# Patient Record
Sex: MALE | Race: WHITE | Employment: FULL TIME | URBAN - METROPOLITAN AREA
[De-identification: names, ages, dates, MRNs, and addresses within clinical notes are randomized per-mention and may not be internally consistent; named-entity substitution may affect disease eponyms.]

---

## 2020-09-24 ENCOUNTER — HOSPITAL ENCOUNTER (OUTPATIENT)
Age: 20
Setting detail: SPECIMEN
Discharge: HOME | End: 2020-09-24
Payer: COMMERCIAL

## 2020-09-24 ENCOUNTER — OFFICE VISIT (OUTPATIENT)
Dept: FAMILY MEDICINE CLINIC | Age: 20
End: 2020-09-24
Payer: COMMERCIAL

## 2020-09-24 VITALS
TEMPERATURE: 97.3 F | DIASTOLIC BLOOD PRESSURE: 74 MMHG | OXYGEN SATURATION: 98 % | SYSTOLIC BLOOD PRESSURE: 110 MMHG | HEIGHT: 72 IN | WEIGHT: 149.2 LBS | BODY MASS INDEX: 20.21 KG/M2 | HEART RATE: 62 BPM

## 2020-09-24 LAB
FERRITIN: 146.7 NG/ML (ref 30–400)
HCT VFR BLD CALC: 40.6 % (ref 42–52)
HEMOGLOBIN: 13.6 G/DL (ref 14–18)
MCH RBC QN AUTO: 31.4 PG (ref 27–31.3)
MCHC RBC AUTO-ENTMCNC: 33.5 % (ref 33–37)
MCV RBC AUTO: 93.9 FL (ref 80–100)
PDW BLD-RTO: 13 % (ref 11.5–14.5)
PLATELET # BLD: 208 K/UL (ref 130–400)
RBC # BLD: 4.33 M/UL (ref 4.7–6.1)
WBC # BLD: 5 K/UL (ref 4.5–11)

## 2020-09-24 PROCEDURE — 90471 IMMUNIZATION ADMIN: CPT | Performed by: FAMILY MEDICINE

## 2020-09-24 PROCEDURE — 82728 ASSAY OF FERRITIN: CPT

## 2020-09-24 PROCEDURE — 85027 COMPLETE CBC AUTOMATED: CPT

## 2020-09-24 PROCEDURE — 99385 PREV VISIT NEW AGE 18-39: CPT | Performed by: FAMILY MEDICINE

## 2020-09-24 PROCEDURE — 90686 IIV4 VACC NO PRSV 0.5 ML IM: CPT | Performed by: FAMILY MEDICINE

## 2020-09-24 RX ORDER — FLUOXETINE HYDROCHLORIDE 40 MG/1
40 CAPSULE ORAL DAILY
COMMUNITY
End: 2022-04-14

## 2020-09-24 SDOH — HEALTH STABILITY: MENTAL HEALTH: HOW OFTEN DO YOU HAVE A DRINK CONTAINING ALCOHOL?: NEVER

## 2020-09-24 ASSESSMENT — ENCOUNTER SYMPTOMS
SHORTNESS OF BREATH: 0
RHINORRHEA: 0
DIARRHEA: 0
WHEEZING: 0
CONSTIPATION: 0
COUGH: 0
ABDOMINAL PAIN: 0
SORE THROAT: 0

## 2020-09-24 ASSESSMENT — PATIENT HEALTH QUESTIONNAIRE - PHQ9
2. FEELING DOWN, DEPRESSED OR HOPELESS: 0
SUM OF ALL RESPONSES TO PHQ QUESTIONS 1-9: 0
SUM OF ALL RESPONSES TO PHQ QUESTIONS 1-9: 0
1. LITTLE INTEREST OR PLEASURE IN DOING THINGS: 0
SUM OF ALL RESPONSES TO PHQ9 QUESTIONS 1 & 2: 0

## 2020-09-24 NOTE — PROGRESS NOTES
[address] [facility] [address] [place] [address] PRIMARY CARE  Kiesha [address] [place] [address]  Dept: [phone]  Dept Fax: : [phone]   Chief Complaint  Chief Complaint   Patient presents with    Establish Care       HPI:  23 y.o.male who presents for establish:  (was seeing Dr. Rj Villalta in pediatrics)    Goes to [facility] for anxiety and depression and doing well on the prozac they provide (since age 8). Currently working as a  and going to school for business. Vapes daily along THC. Hx of anemia and told has low iron and gets lightheaded with standing sometimes. History reviewed. No pertinent past medical history. History reviewed. No pertinent surgical history.   Social History     Socioeconomic History    Marital status: Single     Spouse name: Not on file    Number of children: Not on file    Years of education: Not on file    Highest education level: Not on file   Occupational History    Not on file   Social Needs    Financial resource strain: Not on file    Food insecurity     Worry: Not on file     Inability: Not on file    Transportation needs     Medical: Not on file     Non-medical: Not on file   Tobacco Use    Smoking status: Never Smoker    Smokeless tobacco: Never Used   Substance and Sexual Activity    Alcohol use: Never     Frequency: Never    Drug use: Yes     Types: Marijuana     Comment: daily    Sexual activity: Not on file   Lifestyle    Physical activity     Days per week: Not on file     Minutes per session: Not on file    Stress: Not on file   Relationships    Social connections     Talks on phone: Not on file     Gets together: Not on file     Attends Jehovah's witness service: Not on file     Active member of club or organization: Not on file     Attends meetings of clubs or organizations: Not on file     Relationship status: Not on file    Intimate partner violence     Fear of current or ex partner: Not on

## 2021-10-01 ENCOUNTER — NURSE TRIAGE (OUTPATIENT)
Dept: OTHER | Facility: CLINIC | Age: 21
End: 2021-10-01

## 2021-10-01 NOTE — TELEPHONE ENCOUNTER
Received call from [address] [facility]. Brief description of triage: Pain in right testicle, abdominal pain as well when he pushes on something. Triage indicates for patient to see in office today    Care advice provided, patient verbalizes understanding; denies any other questions or concerns; instructed to call back for any new or worsening symptoms. Writer provided warm transfer to [facility] at [facility] for appointment scheduling. Attention Provider: Thank you for allowing me to participate in the care of your patient. The patient was connected to triage in response to information provided to the [facility]. Please do not respond through this encounter as the response is not directed to a shared pool. Reason for Disposition   Pain in scrotum or testicle is main symptom   Patient wants to be seen    Answer Assessment - Initial Assessment Questions  1. SYMPTOM: \"What's the main symptom you're concerned about? \" (e.g., discharge from penis, rash, pain, itching, swelling)      Right testicular pain and some pain in abdomen  2. LOCATION: \"Where is the abdomen located? \"      Lower right side  3. ONSET: \"When did pain start? \"      10:30 this morning  4. PAIN: \"Is there any pain? \" If so, ask: \"How bad is it? \"  (Scale 1-10; or mild, moderate, severe)      If he pushes on it 5-6/10  5. URINE: Carlo Dennis difficulty passing urine? \" If so, ask: \"When was the last time? \"      none  6. CAUSE: \"What do you think is causing the symptoms? \"      May be a hernia  7. OTHER SYMPTOMS: \"Do you have any other symptoms? \" (e.g., fever, abdominal pain, blood in urine)      Abdominal pain as well    Protocols used: PENIS AND SCROTUM SYMPTOMS-ADULT-[place], SCROTAL PAIN-ADULT-[place]

## 2022-04-06 ENCOUNTER — TELEPHONE (OUTPATIENT)
Dept: INTERNAL MEDICINE | Age: 22
End: 2022-04-06

## 2022-04-06 NOTE — TELEPHONE ENCOUNTER
LM for pt's mother to call the office to schedule an appointment if he has not been seen yet for this issue.

## 2022-04-06 NOTE — TELEPHONE ENCOUNTER
----- Message from Bob Mahmood sent at 4/5/2022  5:10 PM EDT -----  Subject: Appointment Request    Reason for Call: Urgent Abdominal Pain    QUESTIONS  Type of Appointment? Established Patient  Reason for appointment request? Other - urgent appointment  Additional Information for Provider? pt mother Bobby Vega called. She said the   pt is having stomach pain, and bloody stool. I triage to A/ and mother   refused the transfer. She said the stool was no longer bloody and wanted   to be scheduled. The call flow was a urgent appointment and I asked to   transfer to A/ again because no availability to schedule. The mother said   she would call tomorrow and hung up.  ---------------------------------------------------------------------------  --------------  CALL BACK INFO  What is the best way for the office to contact you? OK to leave message on   voicemail  Preferred Call Back Phone Number? [phone]  ---------------------------------------------------------------------------  --------------  SCRIPT ANSWERS  Relationship to Patient? Parent  Representative Name? Bobby Vega  Additional information verified (besides Name and Date of Birth)? Phone   Number  Do you have pain that has started or worsened within the past 24 hours? No  Are you vomiting blood or have bloody or black stool? No  Have you recently (14 days) seen a provider for this pain? No  Patient refusal? Patient refuses to be transferred to RN Triage  Have you been diagnosed with, awaiting test results for, or told that you   are suspected of having COVID-19 (Coronavirus)? (If patient has tested   negative or was tested as a requirement for work, school, or travel and   not based on symptoms, answer no)? No  Within the past 10 days have you developed any of the following symptoms   (answer no if symptoms have been present longer than 10 days or began   more than 10 days ago)?  Fever or Chills, Cough, Shortness of breath or   difficulty breathing, Loss of taste or smell, Sore throat, Nasal   congestion, Sneezing or runny nose, Fatigue or generalized body aches   (answer no if pain is specific to a body part e.g. back pain), Diarrhea,   Headache? No  Have you had close contact with someone with COVID-19 in the last 7 days? No  (Service Expert  click yes below to proceed with [employer] As Usual   Scheduling)?  Yes

## 2022-04-14 ENCOUNTER — OFFICE VISIT (OUTPATIENT)
Dept: FAMILY MEDICINE CLINIC | Age: 22
End: 2022-04-14
Payer: COMMERCIAL

## 2022-04-14 VITALS
BODY MASS INDEX: 19.64 KG/M2 | OXYGEN SATURATION: 97 % | SYSTOLIC BLOOD PRESSURE: 124 MMHG | DIASTOLIC BLOOD PRESSURE: 80 MMHG | HEIGHT: 72 IN | HEART RATE: 93 BPM | WEIGHT: 145 LBS | TEMPERATURE: 98.6 F

## 2022-04-14 DIAGNOSIS — K60.2 ANAL FISSURE: ICD-10-CM

## 2022-04-14 DIAGNOSIS — K59.09 OTHER CONSTIPATION: Primary | ICD-10-CM

## 2022-04-14 DIAGNOSIS — L20.82 FLEXURAL ECZEMA: ICD-10-CM

## 2022-04-14 PROCEDURE — 99213 OFFICE O/P EST LOW 20 MIN: CPT | Performed by: FAMILY MEDICINE

## 2022-04-14 RX ORDER — PSYLLIUM HUSK/CALCIUM CARB 1 G-60 MG
CAPSULE ORAL
Qty: 120 CAPSULE | Refills: 11 | Status: SHIPPED | OUTPATIENT
Start: 2022-04-14

## 2022-04-14 RX ORDER — DOCUSATE SODIUM 100 MG/1
100 CAPSULE, LIQUID FILLED ORAL 2 TIMES DAILY
Qty: 60 CAPSULE | Refills: 5 | Status: SHIPPED | OUTPATIENT
Start: 2022-04-14 | End: 2022-05-14

## 2022-04-14 SDOH — ECONOMIC STABILITY: FOOD INSECURITY: WITHIN THE PAST 12 MONTHS, YOU WORRIED THAT YOUR FOOD WOULD RUN OUT BEFORE YOU GOT MONEY TO BUY MORE.: NEVER TRUE

## 2022-04-14 SDOH — ECONOMIC STABILITY: FOOD INSECURITY: WITHIN THE PAST 12 MONTHS, THE FOOD YOU BOUGHT JUST DIDN'T LAST AND YOU DIDN'T HAVE MONEY TO GET MORE.: NEVER TRUE

## 2022-04-14 ASSESSMENT — ENCOUNTER SYMPTOMS
SORE THROAT: 0
RECTAL PAIN: 1
RHINORRHEA: 0
ANAL BLEEDING: 1
COUGH: 0
ABDOMINAL PAIN: 0
DIARRHEA: 0
SHORTNESS OF BREATH: 0
CONSTIPATION: 0
WHEEZING: 0

## 2022-04-14 ASSESSMENT — PATIENT HEALTH QUESTIONNAIRE - PHQ9
SUM OF ALL RESPONSES TO PHQ QUESTIONS 1-9: 0
SUM OF ALL RESPONSES TO PHQ9 QUESTIONS 1 & 2: 0
SUM OF ALL RESPONSES TO PHQ QUESTIONS 1-9: 0
SUM OF ALL RESPONSES TO PHQ QUESTIONS 1-9: 0
1. LITTLE INTEREST OR PLEASURE IN DOING THINGS: 0
2. FEELING DOWN, DEPRESSED OR HOPELESS: 0
SUM OF ALL RESPONSES TO PHQ QUESTIONS 1-9: 0

## 2022-04-14 ASSESSMENT — SOCIAL DETERMINANTS OF HEALTH (SDOH): HOW HARD IS IT FOR YOU TO PAY FOR THE VERY BASICS LIKE FOOD, HOUSING, MEDICAL CARE, AND HEATING?: NOT HARD AT ALL

## 2022-04-14 NOTE — PROGRESS NOTES
[address] [facility] PRIMARY CARE  [address] [address]  Dept: [phone]  Dept Fax: [phone]: [phone]     Chief Complaint  Chief Complaint   Patient presents with    Abdominal Pain     a few weeks ago, sharp pain    Rectal Pain     has an anal fissure he is dealing with also. HPI:  24 y.o.male who presents for the following:      Abd pain: woke with sharp lower abd pain 2 weeks ago; only happened the one time and lasted 4 hours; had a very mild reoccurrence last week; no n/v. Tolerating PO; BM daily which is hard and painful; concerned about having hernia as the source of his abd pain    Rectal pain: x6mo with anal pain; seen twice for this in the past with a different provider and given a suppository and a topical; sometimes bleeds; says he was also given Linzess but stopped it when it caused him diarrhea. Hx of eczema on arms; uses a topical steroid with relief    Review of Systems   Constitutional: Negative for chills and fever. HENT: Negative for congestion, rhinorrhea and sore throat. Respiratory: Negative for cough, shortness of breath and wheezing. Gastrointestinal: Positive for anal bleeding and rectal pain. Negative for abdominal pain, constipation and diarrhea. Endocrine: Negative for polydipsia and polyuria. Genitourinary: Negative for dysuria, frequency and urgency. Skin: Positive for rash. Neurological: Negative for syncope, light-headedness, numbness and headaches. Psychiatric/Behavioral: Negative for sleep disturbance. The patient is not nervous/anxious. History reviewed. No pertinent past medical history. History reviewed. No pertinent surgical history.   Social History     Socioeconomic History    Marital status: Single     Spouse name: Not on file    Number of children: Not on file    Years of education: Not on file    Highest education level: Not on file capsule Take 1 capsule by mouth 2 times daily 60 capsule 5    Psyllium-Calcium (METAMUCIL PLUS CALCIUM) CAPS Take 1 tab twice daily with 8 oz water. 120 capsule 11    hydrocortisone 2.5 % cream Apply topically 2 times daily. 28 g 3     No current facility-administered medications for this visit. Vitals:    04/14/22 1607   BP: 124/80   Site: Right Upper Arm   Position: Sitting   Cuff Size: Medium Adult   Pulse: 93   Temp: 98.6 °F (37 °C)   TempSrc: Infrared   SpO2: 97%   Weight: 145 lb (65.8 kg)   Height: 6' (1.829 m)       Physical exam:  Physical Exam  Vitals reviewed. Constitutional:       General: He is not in acute distress. Appearance: He is well-developed. HENT:      Head: Normocephalic and atraumatic. Mouth/Throat:      Pharynx: No oropharyngeal exudate. Neck:      Thyroid: No thyromegaly. Cardiovascular:      Rate and Rhythm: Normal rate and regular rhythm. Heart sounds: Normal heart sounds. No murmur heard. Pulmonary:      Effort: Pulmonary effort is normal. No respiratory distress. Breath sounds: Normal breath sounds. No wheezing. Abdominal:      General: There is no distension. Palpations: Abdomen is soft. Tenderness: There is no abdominal tenderness. There is no guarding or rebound. Musculoskeletal:      Cervical back: Normal range of motion. Lymphadenopathy:      Cervical: No cervical adenopathy. Skin:     General: Skin is warm and dry. Neurological:      Mental Status: He is alert and oriented to person, place, and time. Psychiatric:         Behavior: Behavior normal.         Assessment/Plan:  24 y.o. male here mainly for abd pain:  - Abd pain: benign exam; no hernia; likely constipation related  - Anal pain: exam deferred; will need to treat the constipation so will start with fluids, fiber, colace; encouraged sitz baths     Diagnosis Orders   1.  Other constipation  docusate sodium (COLACE) 100 MG capsule    Psyllium-Calcium (METAMUCIL PLUS CALCIUM) CAPS   2. Anal fissure     3. Flexural eczema  hydrocortisone 2.5 % cream        Return if symptoms worsen or fail to improve.     Yesenia Tate MD

## 2023-02-23 ENCOUNTER — OFFICE VISIT (OUTPATIENT)
Dept: FAMILY MEDICINE CLINIC | Age: 23
End: 2023-02-23

## 2023-02-23 VITALS
BODY MASS INDEX: 21.26 KG/M2 | SYSTOLIC BLOOD PRESSURE: 132 MMHG | WEIGHT: 157 LBS | HEIGHT: 72 IN | TEMPERATURE: 97.4 F | OXYGEN SATURATION: 99 % | HEART RATE: 74 BPM | DIASTOLIC BLOOD PRESSURE: 88 MMHG

## 2023-02-23 DIAGNOSIS — K62.89 ANAL PAIN: ICD-10-CM

## 2023-02-23 DIAGNOSIS — K59.00 CONSTIPATION, UNSPECIFIED CONSTIPATION TYPE: Primary | ICD-10-CM

## 2023-02-23 PROCEDURE — 99213 OFFICE O/P EST LOW 20 MIN: CPT | Performed by: FAMILY MEDICINE

## 2023-02-23 SDOH — ECONOMIC STABILITY: TRANSPORTATION INSECURITY
IN THE PAST 12 MONTHS, HAS LACK OF TRANSPORTATION KEPT YOU FROM MEETINGS, WORK, OR FROM GETTING THINGS NEEDED FOR DAILY LIVING?: NO

## 2023-02-23 SDOH — ECONOMIC STABILITY: HOUSING INSECURITY
IN THE LAST 12 MONTHS, WAS THERE A TIME WHEN YOU DID NOT HAVE A STEADY PLACE TO SLEEP OR SLEPT IN A SHELTER (INCLUDING NOW)?: NO

## 2023-02-23 SDOH — ECONOMIC STABILITY: INCOME INSECURITY: HOW HARD IS IT FOR YOU TO PAY FOR THE VERY BASICS LIKE FOOD, HOUSING, MEDICAL CARE, AND HEATING?: NOT HARD AT ALL

## 2023-02-23 SDOH — ECONOMIC STABILITY: FOOD INSECURITY: WITHIN THE PAST 12 MONTHS, YOU WORRIED THAT YOUR FOOD WOULD RUN OUT BEFORE YOU GOT MONEY TO BUY MORE.: NEVER TRUE

## 2023-02-23 SDOH — ECONOMIC STABILITY: FOOD INSECURITY: WITHIN THE PAST 12 MONTHS, THE FOOD YOU BOUGHT JUST DIDN'T LAST AND YOU DIDN'T HAVE MONEY TO GET MORE.: NEVER TRUE

## 2023-02-23 ASSESSMENT — PATIENT HEALTH QUESTIONNAIRE - PHQ9
SUM OF ALL RESPONSES TO PHQ QUESTIONS 1-9: 0
SUM OF ALL RESPONSES TO PHQ9 QUESTIONS 1 & 2: 0
SUM OF ALL RESPONSES TO PHQ QUESTIONS 1-9: 0
2. FEELING DOWN, DEPRESSED OR HOPELESS: 0
1. LITTLE INTEREST OR PLEASURE IN DOING THINGS: 0

## 2023-02-23 ASSESSMENT — ENCOUNTER SYMPTOMS
WHEEZING: 0
SORE THROAT: 0
ABDOMINAL PAIN: 0
RHINORRHEA: 0
COUGH: 0
CONSTIPATION: 0
SHORTNESS OF BREATH: 0
DIARRHEA: 0

## 2023-02-23 NOTE — PROGRESS NOTES
[facility] [address] [facility] PRIMARY CARE  [address] [place] [id number]  Dept: [phone]  Dept Fax: [phone]  Loc: [phone]     Chief Complaint  Chief Complaint   Patient presents with    Rectal Pain     Recurrent problem, itching, little bit of blood, pain       HPI:  22 y.o.male who presents for the following:      Rectal pain: hx of constipation; taking miralax and eating more fiber; mostly itching right now; twice weakly it bleeds a little; he worries about a fissure or internal hemorrhoid    Recall 4/14/22: Rectal pain: x6mo with anal pain; seen twice for this in the past with a different provider and given a suppository and a topical; sometimes bleeds; says he was also given Linzess but stopped it when it caused him diarrhea. Review of Systems   Constitutional:  Negative for chills and fever. HENT:  Negative for congestion, rhinorrhea and sore throat. Respiratory:  Negative for cough, shortness of breath and wheezing. Gastrointestinal:  Negative for abdominal pain, constipation and diarrhea. Endocrine: Negative for polydipsia and polyuria. Genitourinary:  Negative for dysuria, frequency and urgency. Neurological:  Negative for syncope, light-headedness, numbness and headaches. Psychiatric/Behavioral:  Negative for sleep disturbance. The patient is not nervous/anxious. Past Medical History:   Diagnosis Date    Anxiety     Depression      No past surgical history on file. Social History     Socioeconomic History    Marital status: Single     Spouse name: Not on file    Number of children: Not on file    Years of education: Not on file    Highest education level: Not on file   Occupational History    Not on file   Tobacco Use    Smoking status: Heavy Smoker     Types: E-Cigarettes    Smokeless tobacco: Never   Vaping Use    Vaping Use: Every day   Substance and Sexual Activity    Alcohol use:  Yes Alcohol/week: 20.0 standard drinks     Types: 20 Cans of beer per week    Drug use: Yes     Types: Marijuana Brandoell Goldberg)     Comment: daily    Sexual activity: Not Currently   Other Topics Concern    Not on file   Social History Narrative    Not on file     Social Determinants of Health     Financial Resource Strain: Low Risk     Difficulty of Paying Living Expenses: Not hard at all   Food Insecurity: No Food Insecurity    Worried About [address] in the Last Year: Never true    [address] in the Last Year: Never true   Transportation Needs: Unknown    Lack of Transportation (Medical): Not on file    Lack of Transportation (Non-Medical): No   Physical Activity: Not on file   Stress: Not on file   Social Connections: Not on file   Intimate Partner Violence: Not on file   Housing Stability: Unknown    Unable to Pay for Housing in the Last Year: Not on file    Number of Places Lived in the Last Year: Not on file    Unstable Housing in the Last Year: No     Family History   Problem Relation Age of Onset    Hypertension Father       Allergies   Allergen Reactions    Seasonal Other (See Comments)     Current Outpatient Medications   Medication Sig Dispense Refill    hydrocortisone 2.5 % cream Apply topically 2 times daily. 28 g 3    Psyllium-Calcium (METAMUCIL PLUS CALCIUM) CAPS Take 1 tab twice daily with 8 oz water. 120 capsule 11     No current facility-administered medications for this visit. Vitals:    02/23/23 1603   BP: 132/88   Pulse: 74   Temp: 97.4 °F (36.3 °C)   TempSrc: Infrared   SpO2: 99%   Weight: 157 lb (71.2 kg)   Height: 6' (1.829 m)       Physical exam:  Physical Exam  Vitals reviewed. Constitutional:       General: He is not in acute distress. Appearance: He is well-developed. HENT:      Head: Normocephalic and atraumatic. Cardiovascular:      Rate and Rhythm: Normal rate. Pulmonary:      Effort: Pulmonary effort is normal. No respiratory distress.    Musculoskeletal: Cervical back: Normal range of motion. Skin:     General: Skin is warm and dry. Neurological:      Mental Status: He is alert and oriented to person, place, and time. Psychiatric:         Behavior: Behavior normal.       Assessment/Plan:  25 y.o. male here mainly for anal pain:  - discussed softening the stools to avoid straining but he feels the miralax is good for that; discussed wiping with moist wipes or using sitz baths; discussed topicals and suppositories but after this many years of symptoms might benefit from seeing GI for the chronic constipation     Diagnosis Orders   1. Constipation, unspecified constipation type  Ambulatory referral to Gastroenterology      2. Anal pain  Ambulatory referral to Gastroenterology           Return if symptoms worsen or fail to improve.     Elvi Silverman MD

## 2023-03-27 ENCOUNTER — TELEPHONE (OUTPATIENT)
Dept: GASTROENTEROLOGY | Age: 23
End: 2023-03-27

## 2023-04-25 ENCOUNTER — OFFICE VISIT (OUTPATIENT)
Dept: GASTROENTEROLOGY | Age: 23
End: 2023-04-25
Payer: COMMERCIAL

## 2023-04-25 VITALS
BODY MASS INDEX: 21.97 KG/M2 | SYSTOLIC BLOOD PRESSURE: 138 MMHG | OXYGEN SATURATION: 97 % | HEART RATE: 90 BPM | DIASTOLIC BLOOD PRESSURE: 80 MMHG | WEIGHT: 162 LBS

## 2023-04-25 DIAGNOSIS — K62.5 BLOOD PER RECTUM: Primary | ICD-10-CM

## 2023-04-25 PROCEDURE — 99204 OFFICE O/P NEW MOD 45 MIN: CPT | Performed by: INTERNAL MEDICINE

## 2023-04-25 RX ORDER — HYDROCORTISONE ACETATE 25 MG/1
25 SUPPOSITORY RECTAL EVERY 12 HOURS
Qty: 14 SUPPOSITORY | Refills: 1 | Status: SHIPPED | OUTPATIENT
Start: 2023-04-25

## 2023-04-25 ASSESSMENT — ENCOUNTER SYMPTOMS
CONSTIPATION: 1
TROUBLE SWALLOWING: 0
ABDOMINAL DISTENTION: 0
DIARRHEA: 0
PHOTOPHOBIA: 0
VOICE CHANGE: 0
EYE REDNESS: 0
EYE PAIN: 0
NAUSEA: 0
ABDOMINAL PAIN: 0
BLOOD IN STOOL: 1
CHEST TIGHTNESS: 0
SHORTNESS OF BREATH: 0
COLOR CHANGE: 0
RECTAL PAIN: 0
VOMITING: 0
WHEEZING: 0

## 2023-04-25 NOTE — PROGRESS NOTES
Subjective:      Patient ID: Teri Vila is a 25 y.o. male who presents today for:  Chief Complaint   Patient presents with    Constipation       HPI  This is a pleasant 25year-old who came for further evaluation and management. Patient mentioned this and having longstanding history of constipation. He describes having 1-2 bowel movements a day however the stool seems to be hard verbatim. Patient does also describes straining and pain with bowel movements. Patient also reports intermittent blood per rectum. No associated abdominal pain. As mentioned he does report pain only with bowel movements. No alternating diarrhea. Symptoms worsen over the last several months. No associated weight loss. Not anticoagulation. Patient attributes the blood per rectum to hemorrhoids. No family history of CRC or IBD. Patient came in today to establish care and further management  Past Medical History:   Diagnosis Date    Anxiety     Depression      No past surgical history on file. Social History     Socioeconomic History    Marital status: Single     Spouse name: Not on file    Number of children: Not on file    Years of education: Not on file    Highest education level: Not on file   Occupational History    Not on file   Tobacco Use    Smoking status: Heavy Smoker     Types: E-Cigarettes    Smokeless tobacco: Never   Vaping Use    Vaping Use: Every day   Substance and Sexual Activity    Alcohol use:  Yes     Alcohol/week: 20.0 standard drinks     Types: 20 Cans of beer per week    Drug use: Yes     Types: Marijuana Shellye Burkitt)     Comment: daily    Sexual activity: Not Currently   Other Topics Concern    Not on file   Social History Narrative    Not on file     Social Determinants of Health     Financial Resource Strain: Low Risk     Difficulty of Paying Living Expenses: Not hard at all   Food Insecurity: No Food Insecurity    Worried About [address] [employer] in the Last Year: Never true    [address] in the Last

## 2023-07-17 ENCOUNTER — PREP FOR PROCEDURE (OUTPATIENT)
Dept: GASTROENTEROLOGY | Age: 23
End: 2023-07-17

## 2023-07-17 PROBLEM — K92.1 BLOOD IN STOOL: Status: ACTIVE | Noted: 2023-07-17

## 2023-08-25 ENCOUNTER — HOSPITAL ENCOUNTER (OUTPATIENT)
Dept: DATA CONVERSION | Facility: HOSPITAL | Age: 23
End: 2023-08-25
Attending: ORTHOPAEDIC SURGERY | Admitting: ORTHOPAEDIC SURGERY
Payer: COMMERCIAL

## 2023-08-25 DIAGNOSIS — S62.633B DISPLACED FRACTURE OF DISTAL PHALANX OF LEFT MIDDLE FINGER, INITIAL ENCOUNTER FOR OPEN FRACTURE: ICD-10-CM

## 2023-09-01 RX ORDER — SODIUM CHLORIDE 9 MG/ML
INJECTION, SOLUTION INTRAVENOUS CONTINUOUS
Status: CANCELLED | OUTPATIENT
Start: 2023-09-01

## 2023-09-01 RX ORDER — SODIUM CHLORIDE 9 MG/ML
INJECTION, SOLUTION INTRAVENOUS PRN
Status: CANCELLED | OUTPATIENT
Start: 2023-09-01

## 2023-09-01 RX ORDER — SODIUM CHLORIDE 0.9 % (FLUSH) 0.9 %
5-40 SYRINGE (ML) INJECTION EVERY 12 HOURS SCHEDULED
Status: CANCELLED | OUTPATIENT
Start: 2023-09-01

## 2023-09-05 ENCOUNTER — ANESTHESIA (OUTPATIENT)
Dept: ENDOSCOPY | Age: 23
End: 2023-09-05
Payer: COMMERCIAL

## 2023-09-05 ENCOUNTER — HOSPITAL ENCOUNTER (OUTPATIENT)
Age: 23
Setting detail: OUTPATIENT SURGERY
Discharge: HOME | End: 2023-09-05
Attending: INTERNAL MEDICINE | Admitting: INTERNAL MEDICINE
Payer: COMMERCIAL

## 2023-09-05 ENCOUNTER — ANESTHESIA EVENT (OUTPATIENT)
Dept: ENDOSCOPY | Age: 23
End: 2023-09-05
Payer: COMMERCIAL

## 2023-09-05 VITALS
BODY MASS INDEX: 21.67 KG/M2 | TEMPERATURE: 98.2 F | OXYGEN SATURATION: 98 % | HEART RATE: 66 BPM | RESPIRATION RATE: 18 BRPM | DIASTOLIC BLOOD PRESSURE: 62 MMHG | SYSTOLIC BLOOD PRESSURE: 98 MMHG | HEIGHT: 72 IN | WEIGHT: 160 LBS

## 2023-09-05 DIAGNOSIS — K92.1 BLOOD IN STOOL: ICD-10-CM

## 2023-09-05 PROCEDURE — 7100000010 HC PHASE II RECOVERY - FIRST 15 MIN: Performed by: INTERNAL MEDICINE

## 2023-09-05 PROCEDURE — 7100000011 HC PHASE II RECOVERY - ADDTL 15 MIN: Performed by: INTERNAL MEDICINE

## 2023-09-05 PROCEDURE — 3700000000 HC ANESTHESIA ATTENDED CARE: Performed by: INTERNAL MEDICINE

## 2023-09-05 PROCEDURE — 3609027000 HC COLONOSCOPY: Performed by: INTERNAL MEDICINE

## 2023-09-05 PROCEDURE — 2580000003 HC RX 258

## 2023-09-05 PROCEDURE — 2709999900 HC NON-CHARGEABLE SUPPLY: Performed by: INTERNAL MEDICINE

## 2023-09-05 PROCEDURE — 6370000000 HC RX 637 (ALT 250 FOR IP): Performed by: INTERNAL MEDICINE

## 2023-09-05 PROCEDURE — 2500000003 HC RX 250 WO HCPCS: Performed by: REGISTERED NURSE

## 2023-09-05 PROCEDURE — 45378 DIAGNOSTIC COLONOSCOPY: CPT | Performed by: INTERNAL MEDICINE

## 2023-09-05 PROCEDURE — 6360000002 HC RX W HCPCS: Performed by: REGISTERED NURSE

## 2023-09-05 PROCEDURE — 2580000003 HC RX 258: Performed by: INTERNAL MEDICINE

## 2023-09-05 RX ORDER — SODIUM CHLORIDE 9 MG/ML
INJECTION, SOLUTION INTRAVENOUS PRN
Status: DISCONTINUED | OUTPATIENT
Start: 2023-09-05 | End: 2023-09-05 | Stop reason: HOSPADM

## 2023-09-05 RX ORDER — MAGNESIUM HYDROXIDE 1200 MG/15ML
LIQUID ORAL PRN
Status: DISCONTINUED | OUTPATIENT
Start: 2023-09-05 | End: 2023-09-05 | Stop reason: ALTCHOICE

## 2023-09-05 RX ORDER — SODIUM CHLORIDE 0.9 % (FLUSH) 0.9 %
5-40 SYRINGE (ML) INJECTION EVERY 12 HOURS SCHEDULED
Status: DISCONTINUED | OUTPATIENT
Start: 2023-09-05 | End: 2023-09-05 | Stop reason: HOSPADM

## 2023-09-05 RX ORDER — SODIUM CHLORIDE 9 MG/ML
INJECTION, SOLUTION INTRAVENOUS
Status: COMPLETED
Start: 2023-09-05 | End: 2023-09-05

## 2023-09-05 RX ORDER — GLYCOPYRROLATE 1 MG/5 ML
SYRINGE (ML) INTRAVENOUS PRN
Status: DISCONTINUED | OUTPATIENT
Start: 2023-09-05 | End: 2023-09-05 | Stop reason: SDUPTHER

## 2023-09-05 RX ORDER — SIMETHICONE 20 MG/.3ML
EMULSION ORAL PRN
Status: DISCONTINUED | OUTPATIENT
Start: 2023-09-05 | End: 2023-09-05 | Stop reason: ALTCHOICE

## 2023-09-05 RX ORDER — SODIUM CHLORIDE 9 MG/ML
INJECTION, SOLUTION INTRAVENOUS CONTINUOUS
Status: DISCONTINUED | OUTPATIENT
Start: 2023-09-05 | End: 2023-09-05 | Stop reason: HOSPADM

## 2023-09-05 RX ORDER — PROPOFOL 10 MG/ML
INJECTION, EMULSION INTRAVENOUS PRN
Status: DISCONTINUED | OUTPATIENT
Start: 2023-09-05 | End: 2023-09-05 | Stop reason: SDUPTHER

## 2023-09-05 RX ORDER — LIDOCAINE HYDROCHLORIDE 20 MG/ML
INJECTION, SOLUTION INFILTRATION; PERINEURAL PRN
Status: DISCONTINUED | OUTPATIENT
Start: 2023-09-05 | End: 2023-09-05 | Stop reason: SDUPTHER

## 2023-09-05 RX ADMIN — PROPOFOL 50 MG: 10 INJECTION, EMULSION INTRAVENOUS at 08:46

## 2023-09-05 RX ADMIN — PROPOFOL 150 MG: 10 INJECTION, EMULSION INTRAVENOUS at 08:43

## 2023-09-05 RX ADMIN — LIDOCAINE HYDROCHLORIDE 100 MG: 20 INJECTION, SOLUTION INFILTRATION; PERINEURAL at 08:17

## 2023-09-05 RX ADMIN — Medication 0.2 MG: at 08:17

## 2023-09-05 RX ADMIN — SODIUM CHLORIDE: 9 INJECTION, SOLUTION INTRAVENOUS at 08:37

## 2023-09-05 RX ADMIN — PROPOFOL 100 MG: 10 INJECTION, EMULSION INTRAVENOUS at 08:49

## 2023-09-05 RX ADMIN — PROPOFOL 100 MG: 10 INJECTION, EMULSION INTRAVENOUS at 08:51

## 2023-09-05 ASSESSMENT — PAIN - FUNCTIONAL ASSESSMENT: PAIN_FUNCTIONAL_ASSESSMENT: 0-10

## 2023-09-05 NOTE — ANESTHESIA POSTPROCEDURE EVALUATION
Department of Anesthesiology  Postprocedure Note    Patient: Kasandra Webb  MRN: [id number]  YOB: 2000  Date of evaluation: 9/5/2023      Procedure Summary     Date: 09/05/23 Room / Location: [address] [place] [address]    Anesthesia Start: 1971 Anesthesia Stop: 7046    Procedure: COLONOSCOPY DIAGNOSTIC Diagnosis:       Blood in stool      (Blood in stool [K92.1])    Surgeons: Gareth Griffith MD Responsible Provider: LASHAY Flores CRNA    Anesthesia Type: MAC ASA Status: 1          Anesthesia Type: No value filed.     Rosana Phase I: Rosana Score: 10    Rosana Phase II:        Anesthesia Post Evaluation    Patient location during evaluation: bedside  Patient participation: complete - patient participated  Level of consciousness: awake and awake and alert  Airway patency: patent  Nausea & Vomiting: no nausea and no vomiting  Complications: no  Cardiovascular status: blood pressure returned to baseline and hemodynamically stable  Respiratory status: acceptable  Hydration status: euvolemic  Pain management: adequate

## 2023-09-29 VITALS — BODY MASS INDEX: 21.47 KG/M2 | WEIGHT: 158.51 LBS | HEIGHT: 72 IN

## 2023-09-30 NOTE — H&P
History & Physical Reviewed:   I have reviewed the History and Physical dated:  24-Aug-2023   History and Physical reviewed and relevant findings noted. Patient examined to review pertinent physical  findings.: No significant changes   Home Medications Reviewed: no changes noted   Allergies Reviewed: no changes noted     Consent:   COVID-19 Consent:  ·  COVID-19 Risk Consent Surgeon has reviewed key risks related to the risk of connor COVID-19 and if they contract COVID-19 what the risks are.       Electronic Signatures:  Marcos Santacruz ()  (Signed 25-Aug-2023 08:20)   Authored: History & Physical Reviewed, Consent, Note  Completion      Last Updated: 25-Aug-2023 08:20 by Marcos Santacruz ()

## 2023-10-01 NOTE — OP NOTE
Post Operative Note:     Post-Procedure Diagnosis: Open fracture left long  finger distal phalanx with nailbed laceration   Procedure: 1.   2.   3.   4.   5.   Surgeon: Marcos Santacruz D.O.   Resident/Fellow/Other Assistant: DELMA Mera   Estimated Blood Loss (mL): Less than 10 cc   Specimen: no   Findings: Open fracture left long finger distal phalanx  with nailbed laceration     Operative Report Dictated:  Dictation: not applicable - note contains Operative  Report   Operative Report:    Preoperative diagnosis:Open fracture left long finger distal phalanx with nailbed laceration    Postoperative diagnosis:Same    Procedure planned:Excisional debridement to open fracture of left long finger distal phalanx fracture.  Open treatment of left long finger distal phalanx fracture.  Repair of left long finger nailbed laceration.    Procedure performed:Same    Surgeon:Marcos Santacruz D.O.    Assistant:DELMA Mera  The physician assistant was present to the entire case. Given the nature of the disease process and the procedure to be performed a skilled surgical assistant was necessary during the case. The assistant was necessary in order to hold retractors and directly  assist in the operation. A certified scrub tech was at the back table managing instruments and supplies for the surgical case.  Anesthesia:Digital block monitored by the anesthesia team    Estimated blood loss:Less than 10 cc    Drains:None    Tourniquet:Turnicot for approximately 15 minutes    Specimens:None    Implants:None    Indications for procedure:The patient sustained complex injury to the left long finger in the workplace.  He describes a crushing type mechanism.  X-rays and clinical exam more consistent with open fracture of distal phalanx with nailbed laceration  and avulsion of nail plate.  Treatment options were discussed including operative and nonoperative strategies.  The patient elected to proceed forth with surgery by  way of open exploration with excisional debridement of the wound and open fracture with  open treatment of distal phalanx fracture and nailbed repair.  Informed consent was signed and placed in the chart.    Complications: None noted at the time of surgery    Description of procedure:The patient was taken to the operative suite and placed in the supine position on the operating table.  A timeout was performed and the left long finger confirmed to be the operative site.  The patient was carefully positioned  on the table in such a fashion as to pad all bony prominences and peripheral nerves.  The patient was administered appropriate IV antibiotics.  The digital block was working well.  The patient was prepped and draped in the normal sterile fashion.  After  prepping and draping a turnicot was placed at the base of the left long finger.  The wound was explored.  There was complex stellate laceration to the nailbed with obvious open fracture of distal phalanx.  Excisional debridement was performed to the soft  tissues using combination of 15 blade and tenotomy scissors.  Excisional debridement was performed to the open fracture of the left long finger distal phalanx by way of fine curette and rongeur removing small elements of bony particulate.  3 L of normal  sterile saline were then passed through the wound.  Soft tissue repair was then undertaken repairing laceration of the radial and ulnar skin about the lateral nail folds.  A single stitch was placed about a palmar laceration through the pulp space that  was consistent with a blowout type mechanism.  Meticulous techniques were then used to repair the stellate laceration to the nailbed which came together nicely.  Fluoroscopic imaging was then brought in.  The well-balanced soft tissue repair actually  brought the fracture fragments to lay in good position and it was deemed unnecessary to provide pin fixation.  Gentle stressing was imparted and the fracture  fragments deemed stable.  No additional hardware was necessary.  The turnicot was relieved and  hemostasis and viability confirmed.  A soft dressing was placed taking care to tuck some Adaptic in the proximal nail fold to help maintain the germinal space.  Soft dressing and splint was placed.  The patient was allowed to head to recovery in stable  condition.  Overall he tolerated the procedure well.    Disposition: Stable to PACU      Electronic Signatures:  Marcos Santacruz ()  (Signed 25-Aug-2023 14:54)   Authored: Post Operative Note, Note Completion      Last Updated: 25-Aug-2023 14:54 by Marcos Santacruz ()

## 2023-10-11 ENCOUNTER — OFFICE VISIT (OUTPATIENT)
Dept: GASTROENTEROLOGY | Age: 23
End: 2023-10-11
Payer: COMMERCIAL

## 2023-10-11 VITALS
OXYGEN SATURATION: 98 % | HEART RATE: 87 BPM | SYSTOLIC BLOOD PRESSURE: 124 MMHG | DIASTOLIC BLOOD PRESSURE: 80 MMHG | BODY MASS INDEX: 22.78 KG/M2 | WEIGHT: 168 LBS

## 2023-10-11 DIAGNOSIS — K59.09 OTHER CONSTIPATION: Primary | ICD-10-CM

## 2023-10-11 PROCEDURE — 99213 OFFICE O/P EST LOW 20 MIN: CPT | Performed by: NURSE PRACTITIONER

## 2023-10-11 ASSESSMENT — ENCOUNTER SYMPTOMS
RECTAL PAIN: 0
CHEST TIGHTNESS: 0
PHOTOPHOBIA: 0
DIARRHEA: 0
NAUSEA: 0
EYE PAIN: 0
TROUBLE SWALLOWING: 0
CONSTIPATION: 1
ANAL BLEEDING: 1
COLOR CHANGE: 0
EYE REDNESS: 0
VOICE CHANGE: 0
ABDOMINAL DISTENTION: 0
ABDOMINAL PAIN: 0
BLOOD IN STOOL: 0
VOMITING: 0

## 2023-10-11 NOTE — PROGRESS NOTES
relevantimportant investigations summarized below:  Lab Results   Component Value Date/Time    WBC 5.0 09/24/2020 06:42 PM    HGB 13.6 09/24/2020 06:42 PM    HCT 40.6 09/24/2020 06:42 PM    MCV 93.9 09/24/2020 06:42 PM     09/24/2020 06:42 PM    .  No results found for: \"ALT\", \"AST\", \"GGT\", \"ALKPHOS\", \"BILITOT\"    XR finger(s) minimum 2 views    Result Date: 9/28/2023  MRN: [id number] Patient Name: Renata Umana  STUDY: FINGER (S) MIN 2 VIEWS;  Left;  9/28/2023 3:15 pm  INDICATION: pain  S62.633B: Open displaced fracture of distal phalanx of left middle finger, initial encounter. ACCESSION NUMBER(S): [id number]  ORDERING CLINICIAN: ELI MONZON  FINDINGS: X-rays of the left long finger demonstrate healing tuft fracture of distal phalanx. No other acute fracture or dislocation. Electronically signed by: Ashtyn Philippe DO    XR fingers  and hand    Result Date: 9/28/2023  Interpreted By:  Arlene Batres DO MRN: [id number] Patient Name: Qasim Myers: FINGER (S) MIN 2 VIEWS;  Left;  9/28/2023 3:15 pm INDICATION: pain  S62.633B: Open displaced fracture of distal phalanx of left middle finger, initial encounter. ACCESSION NUMBER(S): [id number] ORDERING CLINICIAN: ELI MONZON FINDINGS: X-rays of the left long finger demonstrate healing tuft fracture of distal phalanx. No other acute fracture or dislocation. Lab Results   Component Value Date/Time    FERRITIN 146.7 09/24/2020 06:42 PM     No results found for: \"INR\"  No components found for: \"ACUTEHEPATITISSCREEN\"  No components found for: \"CELIACPANEL\"  No components found for: \"STOOLCULTURE\", \"C. DIFF\", \"STOOLOVAPARASITE\", \"STOOLLEUCOCYTE\"    Endoscopic hx:  Colonoscopy Dr Maurice Bone 9/5/23  Findings:         -  The colon (entire examined portion) appeared normal.  Impression:         -  The entire examined colon is normal.         -  No specimens collected. Assessment:       Diagnosis Orders   1.  Other constipation              Plan:

## 2023-10-16 ENCOUNTER — APPOINTMENT (OUTPATIENT)
Dept: OCCUPATIONAL THERAPY | Facility: CLINIC | Age: 23
End: 2023-10-16
Payer: COMMERCIAL

## 2023-10-18 PROBLEM — B35.9 TINEA: Status: ACTIVE | Noted: 2023-10-18

## 2023-10-18 PROBLEM — K60.2 ANAL FISSURE: Status: ACTIVE | Noted: 2023-10-18

## 2023-10-18 PROBLEM — L85.8 KERATOSIS PILARIS: Status: ACTIVE | Noted: 2023-10-18

## 2023-10-18 PROBLEM — S61.209A AVULSION OF SKIN OF FINGER: Status: ACTIVE | Noted: 2023-10-18

## 2023-10-18 PROBLEM — F32.A DEPRESSION: Status: ACTIVE | Noted: 2023-10-18

## 2023-10-18 PROBLEM — L72.0 EIC (EPIDERMAL INCLUSION CYST): Status: ACTIVE | Noted: 2023-10-18

## 2023-10-18 PROBLEM — R53.83 FATIGUE: Status: ACTIVE | Noted: 2023-10-18

## 2023-10-18 PROBLEM — S61.309A NAIL AVULSION, FINGER: Status: ACTIVE | Noted: 2023-10-18

## 2023-10-18 PROBLEM — S62.639B OPEN FRACTURE OF TUFT OF DISTAL PHALANX OF FINGER: Status: ACTIVE | Noted: 2023-10-18

## 2023-10-18 PROBLEM — K64.9 HEMORRHOIDS: Status: ACTIVE | Noted: 2023-10-18

## 2023-10-18 RX ORDER — ECONAZOLE NITRATE 10 MG/G
1 CREAM TOPICAL 2 TIMES DAILY
COMMUNITY
Start: 2022-01-07

## 2023-10-19 ENCOUNTER — EVALUATION (OUTPATIENT)
Dept: OCCUPATIONAL THERAPY | Facility: CLINIC | Age: 23
End: 2023-10-19
Payer: COMMERCIAL

## 2023-10-19 DIAGNOSIS — S62.633A: Primary | ICD-10-CM

## 2023-10-19 PROCEDURE — 97165 OT EVAL LOW COMPLEX 30 MIN: CPT | Mod: GO | Performed by: OCCUPATIONAL THERAPIST

## 2023-10-19 NOTE — Clinical Note
October 19, 2023     Patient: Forrest Reeder   YOB: 2000   Date of Visit: 10/19/2023       To Whom it May Concern:    Forrest Reeder was seen in my clinic on 10/19/2023. He {Return to school/sport:29992}.    If you have any questions or concerns, please don't hesitate to call.         Sincerely,          Deborah Verdugo OT        CC: No Recipients

## 2023-10-19 NOTE — PROGRESS NOTES
Occupational Therapy    Occupational Therapy Evaluation    Name: Forrest Reeder  MRN: [id number]  : 2000   DATE: 10/19/23   Time Calculation  Start Time: 0520  Stop Time: 0540  Time Calculation (min): 20 min     Insurance Authorization Request: [facility] CLAIM # [id number] 12 VISITS OT 10-3-23 THRU 23     Assessment:  Patient is a 22 y.o. male who is 8 weeks s/p debridement and open treatment to left MF P3 fx/nail bed repair surgery.    Patient resides home independently with his mother, works as a . Meaningful leisure activities are golfing and fishing. Patient has returned to work full duty for 3 weeks now and has no difficulty with work tasks. Has also been golfing and reports no difficulty with leisure activities. After a thorough evaluation, given that patient has full digits motion and functional /pinch strength in left hand, therapist and patient agreed that no therapy needed at this point.     Plan:   No further therapy scheduled.     Subjective   Current Problem:  Open fracture left long finger distal phalanx  with nailbed laceration     DOI: 2023    Surgical Procedure: Excisional debridement and open treatment to left MF P3 fx and nailbed repair  DOS: 2023  Hand Dominance: right   Affected Extremity: left    Mechanism of Injury: A piece of steel bent back and smashed left finger while he was performing a work task. Sustained open fracture left long finger distal phalanx  with nailbed laceration. Proceeded with excisional debridement to open fracture of left long finger distal phalanx fracture/ open treatment of left long finger distal phalanx fracture/ Repair of left long finger nailbed laceration surgery.    Precautions: N/A    Pain Assessment:  Location: Left fingertip   0/10 at rest, 3-4/10 at highest  Description: sharp pain      Homeliving/Social Support: Living home with family    Work:      Meaningful Activities: Golf, fishing    Previous Level of Function  "Per Patient/Caregiver Report: Independent with ADL, IADL, work and leisure activities    Chief complaint: \"I want to make sure I won't have any problems/deficits later on if I don't come to therapy.\"     Patient stated goals for therapy: \"I came to see if I need therapy.\"       Objective   UE Assessment  Observation: No edema presented. Nail bed intact.     Palpation: N/A    Range of Motion (in degrees)  Shoulder AROM: WNL    PROM: WNL     Elbow AROM: WNL      PROM: WNL     Wrist AROM: WNL      PROM: WNL     Digits AROM: WNL   PROM:WNL     Thumb AROM: WNL    PROM: WNL     Sensation: No paresthesia      Strength:   strength: R 90 L 70  3 point pinch strength: R 18 L 14    Treatment Performed: N/A    Outcome Measures:  Quick Dash Score: at eval 9.09%    OP EDUCATION:    N/A    Goals:  N/A    "

## 2023-10-19 NOTE — Clinical Note
October 19, 2023     Patient: Forrest Reeder   YOB: 2000   Date of Visit: 10/19/2023       To Whom It May Concern:    It is my medical opinion that Forrest Reeder {Work release (duty restriction):99712}.    If you have any questions or concerns, please don't hesitate to call.         Sincerely,        Deborah Verdugo OT    CC: No Recipients

## 2024-01-04 ENCOUNTER — APPOINTMENT (OUTPATIENT)
Dept: ORTHOPEDIC SURGERY | Facility: CLINIC | Age: 24
End: 2024-01-04
Payer: COMMERCIAL

## 2024-03-27 ENCOUNTER — HOSPITAL ENCOUNTER (OUTPATIENT)
Age: 24
Setting detail: SPECIMEN
Discharge: HOME | End: 2024-03-27
Payer: COMMERCIAL

## 2024-03-27 ENCOUNTER — OFFICE VISIT (OUTPATIENT)
Dept: FAMILY MEDICINE CLINIC | Age: 24
End: 2024-03-27
Payer: COMMERCIAL

## 2024-03-27 VITALS
BODY MASS INDEX: 22.57 KG/M2 | HEART RATE: 85 BPM | HEIGHT: 72 IN | DIASTOLIC BLOOD PRESSURE: 84 MMHG | WEIGHT: 166.6 LBS | OXYGEN SATURATION: 99 % | SYSTOLIC BLOOD PRESSURE: 122 MMHG

## 2024-03-27 DIAGNOSIS — R23.8 CHANGE OF SKIN COLOR: Primary | ICD-10-CM

## 2024-03-27 DIAGNOSIS — R23.8 CHANGE OF SKIN COLOR: ICD-10-CM

## 2024-03-27 PROBLEM — S61.309A AVULSION OF FINGERNAIL: Status: ACTIVE | Noted: 2023-09-28

## 2024-03-27 PROBLEM — L85.8 KERATOSIS PILARIS: Status: ACTIVE | Noted: 2023-10-18

## 2024-03-27 PROBLEM — H52.13 MYOPIA OF BOTH EYES WITH ASTIGMATISM: Status: ACTIVE | Noted: 2018-05-29

## 2024-03-27 PROBLEM — B35.9 TINEA: Status: ACTIVE | Noted: 2023-10-18

## 2024-03-27 PROBLEM — K64.9 HEMORRHOIDS: Status: ACTIVE | Noted: 2024-03-27

## 2024-03-27 PROBLEM — S62.639B OPEN FRACTURE OF TUFT OF DISTAL PHALANX OF FINGER: Status: ACTIVE | Noted: 2023-08-25

## 2024-03-27 PROBLEM — R53.83 FATIGUE: Status: ACTIVE | Noted: 2024-03-27
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
## 2024-03-27 PROBLEM — K60.2 ANAL FISSURE: Status: ACTIVE | Noted: 2024-03-27

## 2024-03-27 PROBLEM — L72.0 EPIDERMOID CYST: Status: ACTIVE | Noted: 2024-03-27

## 2024-03-27 PROBLEM — R20.0 ANESTHESIA OF SKIN: Status: ACTIVE | Noted: 2023-08-23

## 2024-03-27 PROBLEM — G89.18 POSTOPERATIVE PAIN: Status: ACTIVE | Noted: 2024-03-27

## 2024-03-27 PROBLEM — S67.10XA CRUSHED FINGER: Status: ACTIVE | Noted: 2024-03-27

## 2024-03-27 PROBLEM — B49 INFECTION DUE TO FUNGUS: Status: ACTIVE | Noted: 2024-03-27

## 2024-03-27 PROBLEM — S61.209A AVULSION OF SKIN OF FINGER: Status: ACTIVE | Noted: 2024-03-27

## 2024-03-27 PROBLEM — H52.203 MYOPIA OF BOTH EYES WITH ASTIGMATISM: Status: ACTIVE | Noted: 2018-05-29

## 2024-03-27 PROBLEM — S67.193A CRUSHING INJURY OF LEFT MIDDLE FINGER: Status: ACTIVE | Noted: 2023-08-23

## 2024-03-27 LAB
ALBUMIN SERPL-MCNC: 4.5 G/DL (ref 3.5–4.6)
ALP SERPL-CCNC: 68 U/L (ref 35–104)
ALT SERPL-CCNC: 21 U/L (ref 0–41)
ANION GAP SERPL CALCULATED.3IONS-SCNC: 9 MEQ/L (ref 9–15)
AST SERPL-CCNC: 23 U/L (ref 0–40)
BILIRUB SERPL-MCNC: 0.9 MG/DL (ref 0.2–0.7)
BUN SERPL-MCNC: 19 MG/DL (ref 6–20)
CALCIUM SERPL-MCNC: 9.6 MG/DL (ref 8.5–9.9)
CHLORIDE SERPL-SCNC: 100 MEQ/L (ref 95–107)
CO2 SERPL-SCNC: 28 MEQ/L (ref 20–31)
CREAT SERPL-MCNC: 0.81 MG/DL (ref 0.7–1.2)
ERYTHROCYTE [DISTWIDTH] IN BLOOD BY AUTOMATED COUNT: 12.2 % (ref 11.5–14.5)
GLOBULIN SER CALC-MCNC: 2.8 G/DL (ref 2.3–3.5)
GLUCOSE SERPL-MCNC: 68 MG/DL (ref 70–99)
HCT VFR BLD AUTO: 42.8 % (ref 42–52)
HGB BLD-MCNC: 14.5 G/DL (ref 14–18)
MCH RBC QN AUTO: 32.2 PG (ref 27–31.3)
MCHC RBC AUTO-ENTMCNC: 33.9 % (ref 33–37)
MCV RBC AUTO: 94.9 FL (ref 79–92.2)
PLATELET # BLD AUTO: 223 K/UL (ref 130–400)
POTASSIUM SERPL-SCNC: 4.6 MEQ/L (ref 3.4–4.9)
PROT SERPL-MCNC: 7.3 G/DL (ref 6.3–8)
RBC # BLD AUTO: 4.51 M/UL (ref 4.7–6.1)
SODIUM SERPL-SCNC: 137 MEQ/L (ref 135–144)
WBC # BLD AUTO: 3.9 K/UL (ref 4.8–10.8)

## 2024-03-27 PROCEDURE — 82306 VITAMIN D 25 HYDROXY: CPT

## 2024-03-27 PROCEDURE — 99213 OFFICE O/P EST LOW 20 MIN: CPT | Performed by: NURSE PRACTITIONER

## 2024-03-27 PROCEDURE — 36415 COLL VENOUS BLD VENIPUNCTURE: CPT

## 2024-03-27 PROCEDURE — 82607 VITAMIN B-12: CPT

## 2024-03-27 PROCEDURE — 82746 ASSAY OF FOLIC ACID SERUM: CPT

## 2024-03-27 PROCEDURE — 80053 COMPREHEN METABOLIC PANEL: CPT

## 2024-03-27 PROCEDURE — 85027 COMPLETE CBC AUTOMATED: CPT

## 2024-03-27 SDOH — ECONOMIC STABILITY: FOOD INSECURITY: WITHIN THE PAST 12 MONTHS, YOU WORRIED THAT YOUR FOOD WOULD RUN OUT BEFORE YOU GOT MONEY TO BUY MORE.: NEVER TRUE

## 2024-03-27 SDOH — ECONOMIC STABILITY: FOOD INSECURITY: WITHIN THE PAST 12 MONTHS, THE FOOD YOU BOUGHT JUST DIDN'T LAST AND YOU DIDN'T HAVE MONEY TO GET MORE.: NEVER TRUE

## 2024-03-27 SDOH — ECONOMIC STABILITY: INCOME INSECURITY: HOW HARD IS IT FOR YOU TO PAY FOR THE VERY BASICS LIKE FOOD, HOUSING, MEDICAL CARE, AND HEATING?: NOT HARD AT ALL

## 2024-03-27 ASSESSMENT — PATIENT HEALTH QUESTIONNAIRE - PHQ9
SUM OF ALL RESPONSES TO PHQ QUESTIONS 1-9: 0
7. TROUBLE CONCENTRATING ON THINGS, SUCH AS READING THE NEWSPAPER OR WATCHING TELEVISION: NOT AT ALL
SUM OF ALL RESPONSES TO PHQ QUESTIONS 1-9: 0
SUM OF ALL RESPONSES TO PHQ9 QUESTIONS 1 & 2: 0
8. MOVING OR SPEAKING SO SLOWLY THAT OTHER PEOPLE COULD HAVE NOTICED. OR THE OPPOSITE, BEING SO FIGETY OR RESTLESS THAT YOU HAVE BEEN MOVING AROUND A LOT MORE THAN USUAL: NOT AT ALL
1. LITTLE INTEREST OR PLEASURE IN DOING THINGS: NOT AT ALL
2. FEELING DOWN, DEPRESSED OR HOPELESS: NOT AT ALL
SUM OF ALL RESPONSES TO PHQ QUESTIONS 1-9: 0
5. POOR APPETITE OR OVEREATING: NOT AT ALL
9. THOUGHTS THAT YOU WOULD BE BETTER OFF DEAD, OR OF HURTING YOURSELF: NOT AT ALL
6. FEELING BAD ABOUT YOURSELF - OR THAT YOU ARE A FAILURE OR HAVE LET YOURSELF OR YOUR FAMILY DOWN: NOT AT ALL
SUM OF ALL RESPONSES TO PHQ QUESTIONS 1-9: 0
SUM OF ALL RESPONSES TO PHQ9 QUESTIONS 1 & 2: 0
10. IF YOU CHECKED OFF ANY PROBLEMS, HOW DIFFICULT HAVE THESE PROBLEMS MADE IT FOR YOU TO DO YOUR WORK, TAKE CARE OF THINGS AT HOME, OR GET ALONG WITH OTHER PEOPLE: NOT DIFFICULT AT ALL
3. TROUBLE FALLING OR STAYING ASLEEP: NOT AT ALL
2. FEELING DOWN, DEPRESSED OR HOPELESS: NOT AT ALL
4. FEELING TIRED OR HAVING LITTLE ENERGY: NOT AT ALL
SUM OF ALL RESPONSES TO PHQ QUESTIONS 1-9: 0
SUM OF ALL RESPONSES TO PHQ QUESTIONS 1-9: 0
1. LITTLE INTEREST OR PLEASURE IN DOING THINGS: NOT AT ALL

## 2024-03-27 NOTE — PROGRESS NOTES
[facility]  [address]  [place] [address]  Dept: [phone]  Dept Fax: [phone]  Loc: [phone]     Chief Complaint  Chief Complaint   Patient presents with    Hand Problem     BL hands; Discoloration; started last Friday. No pain.    Dizziness     Mentions that he gets light headed with blood draws.       HPI:  23 y.o.male who presents for the following:      Since last Friday he noticed this discoloration on his hands  He worked out SAT and were really bad   They got better by Monday   Then worked out again yesterday and they got worse      Just Fingertips will happen if its light work  They don't hurt     No numbness or tingling     He's washed them the best he can and it doesn't come off    Cold fingers-always been like this   They do not change with temp   With golf it happened last Friday  He played poker and thinks because they got cold     Quit vaping 2 months ago and moved onto zims  Nicotine pouches   This is a tab that goes on the upper lip   He's been on them for months   He was a  for years   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   HENT:  Negative for congestion, ear pain, rhinorrhea, sore throat and trouble swallowing.    Respiratory:  Negative for cough, shortness of breath and wheezing.    Gastrointestinal:  Negative for diarrhea, nausea and vomiting.   Musculoskeletal:  Negative for myalgias.   Skin:  Positive for color change. Negative for rash.   Neurological:  Negative for dizziness, light-headedness and headaches.   Psychiatric/Behavioral:  Negative for agitation, confusion and hallucinations.        Past

## 2024-03-28 ENCOUNTER — TELEPHONE (OUTPATIENT)
Dept: FAMILY MEDICINE CLINIC | Age: 24
End: 2024-03-28

## 2024-03-28 LAB
FOLATE: 17.4 NG/ML (ref 4.8–24.2)
VITAMIN B-12: 529 PG/ML (ref 232–1245)
VITAMIN D 25-HYDROXY: 27.4 NG/ML (ref 30–100)

## (undated) DEVICE — TUBE SET 96 MM 64 MM H2O PERISTALTIC STD AUX CHANNEL

## (undated) DEVICE — BRUSH ENDO CLN L90.5IN SHTH DIA1.7MM BRIST DIA5-7MM 2-6MM

## (undated) DEVICE — SINGLE PORT MANIFOLD: Brand: NEPTUNE 2

## (undated) DEVICE — Device: Brand: ENDO SMARTCAP

## (undated) DEVICE — ENDO CARRY-ON PROCEDURE KIT: Brand: ENDO CARRY-ON PROCEDURE KIT

## (undated) DEVICE — TUBING, SUCTION, 1/4" X 10', STRAIGHT: Brand: MEDLINE